# Patient Record
Sex: OTHER/UNKNOWN | URBAN - METROPOLITAN AREA
[De-identification: names, ages, dates, MRNs, and addresses within clinical notes are randomized per-mention and may not be internally consistent; named-entity substitution may affect disease eponyms.]

---

## 2024-08-13 ENCOUNTER — APPOINTMENT (OUTPATIENT)
Dept: URBAN - METROPOLITAN AREA CLINIC 314 | Age: 24
Setting detail: DERMATOLOGY
End: 2024-08-26

## 2024-08-13 DIAGNOSIS — L21.8 OTHER SEBORRHEIC DERMATITIS: ICD-10-CM

## 2024-08-13 DIAGNOSIS — L63.8 OTHER ALOPECIA AREATA: ICD-10-CM

## 2024-08-13 DIAGNOSIS — L64.8 OTHER ANDROGENIC ALOPECIA: ICD-10-CM

## 2024-08-13 PROCEDURE — OTHER PRESCRIPTION: OTHER

## 2024-08-13 PROCEDURE — 99204 OFFICE O/P NEW MOD 45 MIN: CPT

## 2024-08-13 PROCEDURE — OTHER COUNSELING: OTHER

## 2024-08-13 PROCEDURE — OTHER PRESCRIPTION MEDICATION MANAGEMENT: OTHER

## 2024-08-13 PROCEDURE — OTHER ADDITIONAL NOTES: OTHER

## 2024-08-13 PROCEDURE — OTHER MIPS QUALITY: OTHER

## 2024-08-13 RX ORDER — KETOCONAZOLE 20 MG/ML
SHAMPOO, SUSPENSION TOPICAL
Qty: 120 | Refills: 11 | Status: ERX | COMMUNITY
Start: 2024-08-13

## 2024-08-13 ASSESSMENT — LOCATION DETAILED DESCRIPTION DERM
LOCATION DETAILED: LEFT SUPERIOR PARIETAL SCALP
LOCATION DETAILED: LEFT MEDIAL FRONTAL SCALP
LOCATION DETAILED: LEFT CENTRAL OCCIPITAL SCALP

## 2024-08-13 ASSESSMENT — LOCATION SIMPLE DESCRIPTION DERM
LOCATION SIMPLE: SCALP
LOCATION SIMPLE: LEFT SCALP

## 2024-08-13 ASSESSMENT — LOCATION ZONE DERM: LOCATION ZONE: SCALP

## 2024-08-13 NOTE — PROCEDURE: COUNSELING
Detail Level: Detailed
Patient Specific Counseling (Will Not Stick From Patient To Patient): -\\nPt reports on treatment for AGA

## 2024-08-13 NOTE — PROCEDURE: PRESCRIPTION MEDICATION MANAGEMENT
Initiate Treatment: ketoconazole 2 % shampoo Qweekly \\nSig: Lather onto scalp three times weekly, let sit for 5 minutes, then rinse
Render In Strict Bullet Format?: No
Detail Level: Zone

## 2024-08-13 NOTE — PROCEDURE: ADDITIONAL NOTES
Render Risk Assessment In Note?: no
Additional Notes: Pt reports having patches missing along the hairline on both sides\\nOn exam today, there is good hair density in what pt reports to be prev affected areas, but reviewed those areas appear to be much shorter in length, presumably regrowth c/w pt's initial presentation \\nDiscussed nature of condition\\nDiscussed treatments are aimed at regrowth, which is currently good and no patchy loss. Tx do not change possibility of flaring in future. No need for further treatment at this time but reviewed potential treatment options if another flare-up occurs or the hair isnt coming back.
Detail Level: Simple